# Patient Record
Sex: MALE | Race: WHITE | Employment: FULL TIME | URBAN - METROPOLITAN AREA
[De-identification: names, ages, dates, MRNs, and addresses within clinical notes are randomized per-mention and may not be internally consistent; named-entity substitution may affect disease eponyms.]

---

## 2019-09-12 ENCOUNTER — APPOINTMENT (OUTPATIENT)
Dept: GENERAL RADIOLOGY | Age: 59
End: 2019-09-12
Payer: COMMERCIAL

## 2019-09-12 ENCOUNTER — HOSPITAL ENCOUNTER (EMERGENCY)
Age: 59
Discharge: HOME | End: 2019-09-12
Attending: EMERGENCY MEDICINE
Payer: COMMERCIAL

## 2019-09-12 VITALS
OXYGEN SATURATION: 98 % | HEIGHT: 64 IN | SYSTOLIC BLOOD PRESSURE: 145 MMHG | HEART RATE: 98 BPM | DIASTOLIC BLOOD PRESSURE: 89 MMHG | RESPIRATION RATE: 18 BRPM | TEMPERATURE: 98.3 F | BODY MASS INDEX: 23.9 KG/M2 | WEIGHT: 140 LBS

## 2019-09-12 DIAGNOSIS — M79.641 RIGHT HAND PAIN: ICD-10-CM

## 2019-09-12 DIAGNOSIS — S60.221A CONTUSION OF RIGHT HAND, INITIAL ENCOUNTER: Primary | ICD-10-CM

## 2019-09-12 DIAGNOSIS — L53.9 ERYTHEMA OF HAND: ICD-10-CM

## 2019-09-12 PROCEDURE — 6370000000 HC RX 637 (ALT 250 FOR IP): Performed by: PHYSICIAN ASSISTANT

## 2019-09-12 PROCEDURE — 99283 EMERGENCY DEPT VISIT LOW MDM: CPT

## 2019-09-12 PROCEDURE — 90715 TDAP VACCINE 7 YRS/> IM: CPT | Performed by: PHYSICIAN ASSISTANT

## 2019-09-12 PROCEDURE — 73130 X-RAY EXAM OF HAND: CPT

## 2019-09-12 PROCEDURE — 90471 IMMUNIZATION ADMIN: CPT | Performed by: PHYSICIAN ASSISTANT

## 2019-09-12 PROCEDURE — 6360000002 HC RX W HCPCS: Performed by: PHYSICIAN ASSISTANT

## 2019-09-12 PROCEDURE — 73110 X-RAY EXAM OF WRIST: CPT

## 2019-09-12 RX ORDER — DOXYCYCLINE HYCLATE 100 MG
100 TABLET ORAL 2 TIMES DAILY
Qty: 20 TABLET | Refills: 0 | Status: SHIPPED | OUTPATIENT
Start: 2019-09-12 | End: 2019-09-22

## 2019-09-12 RX ORDER — IBUPROFEN 600 MG/1
600 TABLET ORAL ONCE
Status: COMPLETED | OUTPATIENT
Start: 2019-09-12 | End: 2019-09-12

## 2019-09-12 RX ORDER — IBUPROFEN 600 MG/1
600 TABLET ORAL EVERY 6 HOURS PRN
Qty: 30 TABLET | Refills: 0 | Status: SHIPPED | OUTPATIENT
Start: 2019-09-12

## 2019-09-12 RX ADMIN — TETANUS TOXOID, REDUCED DIPHTHERIA TOXOID AND ACELLULAR PERTUSSIS VACCINE, ADSORBED 0.5 ML: 5; 2.5; 8; 8; 2.5 SUSPENSION INTRAMUSCULAR at 09:51

## 2019-09-12 RX ADMIN — IBUPROFEN 600 MG: 600 TABLET, FILM COATED ORAL at 09:51

## 2019-09-12 ASSESSMENT — PAIN DESCRIPTION - LOCATION: LOCATION: HAND

## 2019-09-12 ASSESSMENT — PAIN DESCRIPTION - PAIN TYPE: TYPE: ACUTE PAIN

## 2019-09-12 ASSESSMENT — PAIN DESCRIPTION - ORIENTATION: ORIENTATION: RIGHT

## 2019-09-12 ASSESSMENT — PAIN SCALES - GENERAL
PAINLEVEL_OUTOF10: 9
PAINLEVEL_OUTOF10: 9

## 2019-09-26 ENCOUNTER — HOSPITAL ENCOUNTER (OUTPATIENT)
Dept: MRI IMAGING | Age: 59
Discharge: HOME | End: 2019-09-26
Payer: COMMERCIAL

## 2019-09-26 DIAGNOSIS — S67.21XA CRUSH INJURY OF HAND, RIGHT, INITIAL ENCOUNTER: ICD-10-CM

## 2019-09-26 PROCEDURE — 73218 MRI UPPER EXTREMITY W/O DYE: CPT

## 2020-04-10 ENCOUNTER — HOSPITAL ENCOUNTER (OUTPATIENT)
Age: 60
Setting detail: SPECIMEN
Discharge: HOME | End: 2020-04-10
Payer: COMMERCIAL

## 2020-04-10 ENCOUNTER — OFFICE VISIT (OUTPATIENT)
Dept: PRIMARY CARE CLINIC | Age: 60
End: 2020-04-10
Payer: COMMERCIAL

## 2020-04-10 VITALS — TEMPERATURE: 97.6 F | OXYGEN SATURATION: 99 % | HEART RATE: 107 BPM

## 2020-04-10 PROCEDURE — ~ 87635 HC SO COVID-19 ANY TECHNIQUE NON-CDC

## 2020-04-10 PROCEDURE — 99213 OFFICE O/P EST LOW 20 MIN: CPT | Performed by: FAMILY MEDICINE

## 2020-04-10 PROCEDURE — U0002 COVID-19 LAB TEST NON-CDC: HCPCS

## 2020-04-10 RX ORDER — ALBUTEROL SULFATE 90 UG/1
2 AEROSOL, METERED RESPIRATORY (INHALATION) 4 TIMES DAILY PRN
Qty: 3 INHALER | Refills: 1 | Status: SHIPPED | OUTPATIENT
Start: 2020-04-10

## 2020-04-10 RX ORDER — PREDNISONE 10 MG/1
10 TABLET ORAL DAILY
Qty: 10 TABLET | Refills: 0 | Status: SHIPPED | OUTPATIENT
Start: 2020-04-10 | End: 2020-04-20

## 2020-04-10 RX ORDER — AZITHROMYCIN 250 MG/1
250 TABLET, FILM COATED ORAL SEE ADMIN INSTRUCTIONS
Qty: 6 TABLET | Refills: 0 | Status: SHIPPED | OUTPATIENT
Start: 2020-04-10 | End: 2020-04-15

## 2020-04-10 NOTE — PROGRESS NOTES
4/10/20  Candi Mane  1960      [address] [facility] [place]   FLU/COVID-19 CLINIC EVALUATION    HPI SYMPTOMS: Presented with little over 1 w/h/o of sore throat and body aches and pain. For past 48 hours, he is feeling that his above symptoms are getting worst and also develop shortness or breath. He is feverish and low energy. His shortness of breath is also getting worse. He smokes 1/2 pack per day and currently not on any inhalers. He works at [employer]. [x] Fevers  [] Chills  [x] Cough  [] Coughing up blood  [x] Chest Congestion  [] Nasal Congestion  [x] Feeling short of breath  [x] Sometimes  [] Frequently  [] All the time  [] Chest pain  [] Headaches  []Tolerable  [] Severe  [] Sore throat  [x] Muscle aches  [] Nausea  [] Vomiting  []Unable to keep fluids down  [] Diarrhea  []Severe    [] OTHER SYMPTOMS:      Symptom Duration:   [] 1  [] 2   [] 3   [] 4    [] 5   [] 6   [x] 7   [] 8   [] 9   [] 10   [] 11   [] 12   [] 13   [] 14   [] Longer than 14 days    Symptom course:   [x] Worsening     [] Stable     [] Improving    RISK FACTORS:    [] Pregnant or possibly pregnant  [] Age over 61  [] Diabetes  [x] Heart disease  [] Asthma  [] COPD/Other chronic lung diseases  [] Active Cancer  [] On Chemotherapy  [] Taking oral steroids  [] History Lymphoma/Leukemia  [] Close contact with a lab confirmed COVID-19 patient within 14 days of symptom onset  [] History of travel from affected geographical areas within 14 days of symptom onset       VITALS:  Vitals:    04/10/20 1353   Pulse: 107   Temp: 97.6 °F (36.4 °C)   SpO2: 99%      Physical Exam   Constitutional: He is oriented to person, place, and time. He appears well-developed and well-nourished. HENT:   Head: Atraumatic. Mouth/Throat: Oropharynx is clear and moist. No oropharyngeal exudate. Pharyngeal erythema   Eyes: Conjunctivae are normal.   Neck: Normal range of motion. Cardiovascular: Regular rhythm. No murmur heard.   Tachycardia

## 2020-04-13 ENCOUNTER — CARE COORDINATION (OUTPATIENT)
Dept: FAMILY MEDICINE CLINIC | Age: 60
End: 2020-04-13

## 2020-04-13 LAB
SARS-COV-2: NORMAL
SOURCE: NORMAL

## 2020-04-13 NOTE — CARE COORDINATION
COVID-19 Screening Initial Follow-up Note    Patient contacted regarding Tony Solis. Care Transition Nurse/ Ambulatory Care Manager contacted the patient by telephone to perform post discharge assessment. Verified name and  with patient as identifiers. Provided introduction to self, and explanation of the CTN/ACM role, and reason for call due to risk factors for infection and/or exposure to COVID-19. Symptoms reviewed with patient who verbalized the following symptoms: fever, fatigue, pain or aching joints, cough, shortness of breath, sweating, dizziness/lightheadedness and no new/worsening symptoms       Due to no new or worsening symptoms encounter was not routed to provider for escalation. Patient has following risk factors of: CAD and no known risk factors. CTN/ACM reviewed discharge instructions, medical action plan and red flags such as increased shortness of breath, increasing fever and signs of decompensation with patient who verbalized understanding. Discussed exposure protocols and quarantine with CDC Guidelines What to do if you are sick with coronavirus disease  Patient was given an opportunity for questions and concerns. The patient agrees to contact the [facility] exposure line [phone], local [facility] department [place] [facility]: ([phone]) and PCP office for questions related to their healthcare. CTN/ACM provided contact information for future reference. Reviewed and educated patient on any new and changed medications related to discharge diagnosis     Patient/family/caregiver given information for GetWell Loop and agrees to enroll yes  Patient's preferred e-mail:    Patient's preferred phone number:   Based on Loop alert triggers, patient will be contacted by nurse care manager for worsening symptoms.     Plan for follow-up monitoring in GetWell Loop for next 14 days based on severity of symptoms and risk factors      Balwinder Land RN, MSN  Ambulatory Care Manager  [phone]

## 2020-04-19 ENCOUNTER — CARE COORDINATION (OUTPATIENT)
Dept: CARE COORDINATION | Age: 60
End: 2020-04-19

## 2020-04-19 NOTE — CARE COORDINATION
Date/Time:  2020 4:13 PM    Patient contacted regarding Loop Alert received. Verified patients name and  as identifiers. RN contacted the patient by telephone regarding yellow Loop alert. Patient reported the following symptoms: fatigue and shortness of breath, but reports these are \"about how they have been\" and are not worsening. Due to no new or worsing symptoms, patient was advised to self-monitor symptoms at home. Education provided regarding infection prevention, and signs and symptoms of COVID-19 and when to seek medical attention with patient who verbalized understanding. Discussed exposure protocols and quarantine from [address] you at higher risk for severe illness  and given an opportunity for questions and concerns. The patient agrees to contact the COVID-19 hotline [phone] or PCP office for questions related to their healthcare. CTN/ACM provided contact information for future needs. From CDC: Are you at higher risk for severe illness?  Wash your hands often.  Avoid close contact (6 feet, which is about two arm lengths) with people who are sick.  Put distance between yourself and other people if COVID-19 is spreading in your community.  Clean and disinfect frequently touched surfaces.  Avoid all cruise travel and non-essential air travel.  Call your healthcare professional if you have concerns about COVID-19 and your underlying condition or if you are sick. For more information on steps you can take to protect yourself, see CDC's How to Protect Yourself      Patient will continue to self report symptoms using Loop self monitoring. Patient has RN's contact information.

## 2020-04-22 ENCOUNTER — CARE COORDINATION (OUTPATIENT)
Dept: FAMILY MEDICINE CLINIC | Age: 60
End: 2020-04-22

## 2020-04-23 ENCOUNTER — CARE COORDINATION (OUTPATIENT)
Dept: CASE MANAGEMENT | Age: 60
End: 2020-04-23

## 2020-04-23 ENCOUNTER — CARE COORDINATION (OUTPATIENT)
Dept: CARE COORDINATION | Age: 60
End: 2020-04-23

## 2020-04-23 NOTE — CARE COORDINATION
RN/LPN placed call to patient to respond to Yellow alert due to Worsening of symptoms in GetWell Loop. Patient reports pt reports just spoke with another nurse. Able to speak without being sob.         Sarah Roger RN     RN/LPN

## 2020-04-23 NOTE — CARE COORDINATION
Patient did not return call, but responded to Loop message at 8:37 pm on 4/22. The breathing I guess is about the same sometimes I'll have sharp pains it feels like in my lungs every once in awhile but I'm more tired drained of energy little more other than that I guess I'm okay wish I would get better thanks for asking I'll let you know if it gets any worse thank you. RN responded to patient at 8:21 on 4/23 to continue to enter symptoms and report any worsening symptoms in Loop and to PCP.     Joanna Peña RN, MSN  Ambulatory Care Manager  [phone]

## 2020-04-25 ENCOUNTER — CARE COORDINATION (OUTPATIENT)
Dept: CARE COORDINATION | Age: 60
End: 2020-04-25

## 2020-04-27 ENCOUNTER — CARE COORDINATION (OUTPATIENT)
Dept: CASE MANAGEMENT | Age: 60
End: 2020-04-27

## 2020-04-27 NOTE — CARE COORDINATION
COVID LOOP YELLOW ALERT FOLLOW UP CALL:    Yellow Alert noted in remote [address] Monitoring Program.     Patient E-mailed Response 4/26/2020 Jeanie Cui, 11:42 PM     It is Sunday night still not feeling very well I have no more things to fill out I still feel something is wrong my breathing is still shallow and I'm very fatigued my muscles ache something's up I should be getting better by now I would think but I'm not could I have caught this after you took the test please get back with me thank you Iris Davila April 26th20/20. Attempted to call patient - left message with phone number to call back. Also to go to ER if symptoms and breathing worse. Also sent Loop E-mail to patient to call back. Waiting for Response. No further E-mail or Phone call Responses from patient.     Gulshan Gar, LILIANA     [address] [facility]

## 2020-04-28 ENCOUNTER — CARE COORDINATION (OUTPATIENT)
Dept: FAMILY MEDICINE CLINIC | Age: 60
End: 2020-04-28

## 2020-05-11 ENCOUNTER — TELEPHONE (OUTPATIENT)
Dept: PRIMARY CARE CLINIC | Age: 60
End: 2020-05-11

## 2023-02-09 ENCOUNTER — HOSPITAL ENCOUNTER (EMERGENCY)
Age: 63
Discharge: HOME | End: 2023-02-09
Attending: EMERGENCY MEDICINE
Payer: COMMERCIAL

## 2023-02-09 VITALS
RESPIRATION RATE: 18 BRPM | OXYGEN SATURATION: 99 % | BODY MASS INDEX: 20.14 KG/M2 | SYSTOLIC BLOOD PRESSURE: 163 MMHG | DIASTOLIC BLOOD PRESSURE: 92 MMHG | TEMPERATURE: 98.6 F | WEIGHT: 118 LBS | HEART RATE: 78 BPM | HEIGHT: 64 IN

## 2023-02-09 DIAGNOSIS — T30.0 BURN: Primary | ICD-10-CM

## 2023-02-09 PROCEDURE — 90715 TDAP VACCINE 7 YRS/> IM: CPT | Performed by: EMERGENCY MEDICINE

## 2023-02-09 PROCEDURE — 16020 DRESS/DEBRID P-THICK BURN S: CPT

## 2023-02-09 PROCEDURE — 99284 EMERGENCY DEPT VISIT MOD MDM: CPT

## 2023-02-09 PROCEDURE — 6370000000 HC RX 637 (ALT 250 FOR IP): Performed by: EMERGENCY MEDICINE

## 2023-02-09 PROCEDURE — 6360000002 HC RX W HCPCS: Performed by: EMERGENCY MEDICINE

## 2023-02-09 PROCEDURE — 90471 IMMUNIZATION ADMIN: CPT | Performed by: EMERGENCY MEDICINE

## 2023-02-09 RX ORDER — IBUPROFEN 800 MG/1
800 TABLET ORAL 2 TIMES DAILY PRN
Qty: 20 TABLET | Refills: 0 | Status: SHIPPED | OUTPATIENT
Start: 2023-02-09

## 2023-02-09 RX ORDER — GINSENG 100 MG
CAPSULE ORAL
Qty: 14 G | Refills: 0 | Status: SHIPPED | OUTPATIENT
Start: 2023-02-09 | End: 2023-02-19

## 2023-02-09 RX ORDER — GINSENG 100 MG
CAPSULE ORAL ONCE
Status: COMPLETED | OUTPATIENT
Start: 2023-02-09 | End: 2023-02-09

## 2023-02-09 RX ORDER — IBUPROFEN 400 MG/1
800 TABLET ORAL ONCE
Status: COMPLETED | OUTPATIENT
Start: 2023-02-09 | End: 2023-02-09

## 2023-02-09 RX ADMIN — TETANUS TOXOID, REDUCED DIPHTHERIA TOXOID AND ACELLULAR PERTUSSIS VACCINE, ADSORBED 0.5 ML: 5; 2.5; 8; 8; 2.5 SUSPENSION INTRAMUSCULAR at 22:57

## 2023-02-09 RX ADMIN — IBUPROFEN 800 MG: 400 TABLET, FILM COATED ORAL at 22:52

## 2023-02-09 RX ADMIN — BACITRACIN: 500 OINTMENT TOPICAL at 22:53
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
## 2023-11-09 ENCOUNTER — TELEPHONE (OUTPATIENT)
Dept: ONCOLOGY | Age: 63
End: 2023-11-09

## 2023-11-09 NOTE — TELEPHONE ENCOUNTER
LVM for return call for pt to schedule NP appt with Tiff Valverde for Hilar Lymphadenopathy. LVM with direct contact information.

## 2023-11-17 ENCOUNTER — HOSPITAL ENCOUNTER (OUTPATIENT)
Dept: INFUSION THERAPY | Age: 63
Discharge: HOME | End: 2023-11-17
Payer: COMMERCIAL

## 2023-11-17 ENCOUNTER — INITIAL CONSULT (OUTPATIENT)
Dept: ONCOLOGY | Age: 63
End: 2023-11-17
Payer: COMMERCIAL

## 2023-11-17 VITALS
HEART RATE: 85 BPM | OXYGEN SATURATION: 98 % | DIASTOLIC BLOOD PRESSURE: 80 MMHG | TEMPERATURE: 97.7 F | HEIGHT: 64 IN | BODY MASS INDEX: 20.59 KG/M2 | SYSTOLIC BLOOD PRESSURE: 145 MMHG | WEIGHT: 120.6 LBS

## 2023-11-17 DIAGNOSIS — Z12.11 SCREENING FOR MALIGNANT NEOPLASM OF COLON: ICD-10-CM

## 2023-11-17 DIAGNOSIS — R63.4 WEIGHT LOSS, UNINTENTIONAL: Primary | ICD-10-CM

## 2023-11-17 PROCEDURE — 99211 OFF/OP EST MAY X REQ PHY/QHP: CPT

## 2023-11-17 PROCEDURE — G8484 FLU IMMUNIZE NO ADMIN: HCPCS | Performed by: INTERNAL MEDICINE

## 2023-11-17 PROCEDURE — G8420 CALC BMI NORM PARAMETERS: HCPCS | Performed by: INTERNAL MEDICINE

## 2023-11-17 PROCEDURE — 3017F COLORECTAL CA SCREEN DOC REV: CPT | Performed by: INTERNAL MEDICINE

## 2023-11-17 PROCEDURE — 99204 OFFICE O/P NEW MOD 45 MIN: CPT | Performed by: INTERNAL MEDICINE

## 2023-11-17 PROCEDURE — 4004F PT TOBACCO SCREEN RCVD TLK: CPT | Performed by: INTERNAL MEDICINE

## 2023-11-17 PROCEDURE — G8427 DOCREV CUR MEDS BY ELIG CLIN: HCPCS | Performed by: INTERNAL MEDICINE

## 2023-11-17 RX ORDER — METHOCARBAMOL 500 MG/1
500 TABLET, FILM COATED ORAL NIGHTLY
COMMUNITY

## 2023-11-17 RX ORDER — FLUTICASONE PROPIONATE AND SALMETEROL 100; 50 UG/1; UG/1
1 POWDER RESPIRATORY (INHALATION) EVERY 12 HOURS
COMMUNITY

## 2023-11-17 RX ORDER — TAMSULOSIN HYDROCHLORIDE 0.4 MG/1
0.4 CAPSULE ORAL DAILY
COMMUNITY

## 2023-11-17 RX ORDER — DOCUSATE SODIUM 100 MG/1
100 CAPSULE, LIQUID FILLED ORAL 2 TIMES DAILY
COMMUNITY

## 2023-11-17 RX ORDER — GUAIFENESIN 600 MG/1
1200 TABLET, EXTENDED RELEASE ORAL 2 TIMES DAILY
COMMUNITY

## 2023-11-17 ASSESSMENT — PATIENT HEALTH QUESTIONNAIRE - PHQ9
SUM OF ALL RESPONSES TO PHQ9 QUESTIONS 1 & 2: 0
1. LITTLE INTEREST OR PLEASURE IN DOING THINGS: 0
SUM OF ALL RESPONSES TO PHQ QUESTIONS 1-9: 0
SUM OF ALL RESPONSES TO PHQ QUESTIONS 1-9: 0
2. FEELING DOWN, DEPRESSED OR HOPELESS: 0
SUM OF ALL RESPONSES TO PHQ QUESTIONS 1-9: 0
SUM OF ALL RESPONSES TO PHQ QUESTIONS 1-9: 0

## 2023-11-17 NOTE — PROGRESS NOTES
MA Rooming Questions  Patient: Dell Perera  MRN: [id number]    Date: 11/17/2023        New Patient     5. Did the patient have a depression screening completed today?  Yes    No data recorded     PHQ-9 Given to (if applicable):               PHQ-9 Score (if applicable):                     [] Positive     []  Negative              Does question #9 need addressed (if applicable)                     [] Yes    []  No               Eva Combs MA
1     Types: Cigarettes    Smokeless tobacco: Never   Substance Use Topics    Alcohol use: No    Drug use: No   He used to smoke 2 and half PPD. Family History:   No family history on file. Allergies   Allergen Reactions    Norco [Hydrocodone-Acetaminophen]      ITCHING. Pcn [Penicillins] Swelling     Current Outpatient Medications on File Prior to Visit   Medication Sig Dispense Refill    ibuprofen (ADVIL;MOTRIN) 800 MG tablet Take 1 tablet by mouth 2 times daily as needed for Pain 20 tablet 0    albuterol sulfate HFA (VENTOLIN HFA) 108 (90 Base) MCG/ACT inhaler Inhale 2 puffs into the lungs 4 times daily as needed for Wheezing 3 Inhaler 1    ibuprofen (ADVIL;MOTRIN) 600 MG tablet Take 1 tablet by mouth every 6 hours as needed for Pain 30 tablet 0    HYDROcodone-acetaminophen (NORCO) 5-325 MG per tablet Take 1-2 tablets by mouth every 4 hours as needed for Pain 15 tablet 0    nitroGLYCERIN (NITROSTAT) 0.4 MG SL tablet Place 0.4 mg under the tongue every 5 minutes as needed for Chest pain      atorvastatin (LIPITOR) 40 MG tablet Take 40 mg by mouth daily      aspirin 81 MG tablet Take 81 mg by mouth daily      lisinopril (PRINIVIL;ZESTRIL) 2.5 MG tablet Take 2.5 mg by mouth daily      metoprolol (LOPRESSOR) 25 MG tablet Take 25 mg by mouth 2 times daily       No current facility-administered medications on file prior to visit. Review of Systems:    Constitutional:  stable weight, No fever, No chills, No night sweats. Energy level good. Eyes:  No diplopia, No transient or permanent loss of vision, No scotomata. ENT / Mouth:  No epistaxis, No dysphagia, No hoarseness, No oral ulcers, No gingival bleeding. No sore throat, No postnasal drip, No nasal drip, No mouth pain, No sinus pain, No tinnitus, Normal hearing. Cardiovascular:  No chest pain, No palpitations, No syncope, No upper extremity edema, No lower extremity edema, No calf discomfort. Respiratory:  No cough.   No hemoptysis, No pleurisy, No

## 2023-11-19 NOTE — PROGRESS NOTES
Patient Name:  Kaity Richard  Patient :  1960  Patient MRN:  [id number]     Primary Oncologist: Twin Baugh MD  Referring Provider: No primary care provider on file. Date of Service: 2023       Chief Complaint:  No chief complaint on file. FU visit. There is no problem list on file for this patient. HPI:   Kaity Richard is a pleasant 65 yo male patient who was referred for evaluation of hilar LAD in 2023. He was seen in the ER for chronic neck and back pain on 23. CT of cervical spine showed degenerative changes and no acute injury. CT head was neg for acute intracranial pathology. CT lumbar spine showed degenerative changes most pronounced at L4-5. No evidence of an acute fracture or subluxation. He weighed 146 a year ago and weight went down to 113. Currently weight is 120 lbs. 10/24/2023 creat 0.75, alb 3.8. TSH wnl. CBC wnl. PSA <0.1.     2023 CT chest:  1. Moderate centrilobular pulmonary emphysema   2. No acute lung opacity   3. A subcarinal 8 mm lymph node. A 12 x 18 mm right hilar lymph node. A 10 mm left superhilar lymph node. This could be reactive or lymphoproliferative. Continued follow-up suggested     I ordered CT AP d/t unintentional weight loss. He is agreeable to have evaluation for possible EBUS. He has bilateral LE numbness and tingling. Reports he is referred to neurologist.  We discuss about smoking cessation. I referred to GI for screening colonoscopy. He has one child. No cancer in the family. 2023 follow up visit  2032 s/p EBUS. Final Cytologic Diagnosis:   A. Level 7 Lymph Node Fine Needle Aspirate cytology with cell block:   -  Negative for malignancy. -  Lymphocytes are present. B.  4L Lymph Node Fine Needle Aspirate cytology with cell block:   - Negative for malignancy. -  Hypocellular sample. -  Very rare benign bronchial cells and mixed inflammation.     C.  11R Lymph Node Fine Needle Aspirate

## 2023-11-20 ENCOUNTER — TELEPHONE (OUTPATIENT)
Dept: GASTROENTEROLOGY | Age: 63
End: 2023-11-20

## 2023-11-20 NOTE — TELEPHONE ENCOUNTER
Called pt. In regards to a referral for a colon screening and weight loss. Lm for pt to call back to make an appt.

## 2023-11-21 ENCOUNTER — TELEPHONE (OUTPATIENT)
Dept: ONCOLOGY | Age: 63
End: 2023-11-21

## 2023-11-21 NOTE — TELEPHONE ENCOUNTER
Left message with time and prep of CT scan to be done on 12/6/23 [facility] arrival at  9 AM. Informed to call with any questions.

## 2023-11-22 ENCOUNTER — HOSPITAL ENCOUNTER (OUTPATIENT)
Dept: CT IMAGING | Age: 63
Discharge: HOME | End: 2023-11-22
Attending: INTERNAL MEDICINE

## 2023-11-22 DIAGNOSIS — Z00.6 EXAMINATION FOR NORMAL COMPARISON FOR CLINICAL RESEARCH: ICD-10-CM

## 2023-11-27 ENCOUNTER — HOSPITAL ENCOUNTER (OUTPATIENT)
Age: 63
Setting detail: OUTPATIENT SURGERY
Discharge: HOME | End: 2023-11-27
Attending: INTERNAL MEDICINE | Admitting: INTERNAL MEDICINE
Payer: COMMERCIAL

## 2023-11-27 ENCOUNTER — ANESTHESIA EVENT (OUTPATIENT)
Dept: ENDOSCOPY | Age: 63
End: 2023-11-27
Payer: COMMERCIAL

## 2023-11-27 ENCOUNTER — ANESTHESIA (OUTPATIENT)
Dept: ENDOSCOPY | Age: 63
End: 2023-11-27
Payer: COMMERCIAL

## 2023-11-27 VITALS
DIASTOLIC BLOOD PRESSURE: 76 MMHG | TEMPERATURE: 98 F | WEIGHT: 120 LBS | HEART RATE: 68 BPM | RESPIRATION RATE: 16 BRPM | OXYGEN SATURATION: 96 % | SYSTOLIC BLOOD PRESSURE: 138 MMHG | BODY MASS INDEX: 20.49 KG/M2 | HEIGHT: 64 IN

## 2023-11-27 PROCEDURE — 88177 CYTP FNA EVAL EA ADDL: CPT

## 2023-11-27 PROCEDURE — 2500000003 HC RX 250 WO HCPCS: Performed by: NURSE ANESTHETIST, CERTIFIED REGISTERED

## 2023-11-27 PROCEDURE — 3700000000 HC ANESTHESIA ATTENDED CARE: Performed by: INTERNAL MEDICINE

## 2023-11-27 PROCEDURE — 7100000001 HC PACU RECOVERY - ADDTL 15 MIN: Performed by: INTERNAL MEDICINE

## 2023-11-27 PROCEDURE — 88305 TISSUE EXAM BY PATHOLOGIST: CPT

## 2023-11-27 PROCEDURE — 88172 CYTP DX EVAL FNA 1ST EA SITE: CPT

## 2023-11-27 PROCEDURE — C1725 CATH, TRANSLUMIN NON-LASER: HCPCS | Performed by: INTERNAL MEDICINE

## 2023-11-27 PROCEDURE — 6360000002 HC RX W HCPCS: Performed by: NURSE ANESTHETIST, CERTIFIED REGISTERED

## 2023-11-27 PROCEDURE — 3700000001 HC ADD 15 MINUTES (ANESTHESIA): Performed by: INTERNAL MEDICINE

## 2023-11-27 PROCEDURE — 2580000003 HC RX 258: Performed by: ANESTHESIOLOGY

## 2023-11-27 PROCEDURE — 7100000011 HC PHASE II RECOVERY - ADDTL 15 MIN: Performed by: INTERNAL MEDICINE

## 2023-11-27 PROCEDURE — 88173 CYTOPATH EVAL FNA REPORT: CPT

## 2023-11-27 PROCEDURE — 7100000000 HC PACU RECOVERY - FIRST 15 MIN: Performed by: INTERNAL MEDICINE

## 2023-11-27 PROCEDURE — 7100000010 HC PHASE II RECOVERY - FIRST 15 MIN: Performed by: INTERNAL MEDICINE

## 2023-11-27 PROCEDURE — 2720000010 HC SURG SUPPLY STERILE: Performed by: INTERNAL MEDICINE

## 2023-11-27 PROCEDURE — 2709999900 HC NON-CHARGEABLE SUPPLY: Performed by: INTERNAL MEDICINE

## 2023-11-27 PROCEDURE — 3603165200 HC BRNCHSC EBUS GUIDED SAMPL 1/2 NODE STATION/STRUX: Performed by: INTERNAL MEDICINE

## 2023-11-27 RX ORDER — SODIUM CHLORIDE, SODIUM LACTATE, POTASSIUM CHLORIDE, CALCIUM CHLORIDE 600; 310; 30; 20 MG/100ML; MG/100ML; MG/100ML; MG/100ML
INJECTION, SOLUTION INTRAVENOUS CONTINUOUS
Status: DISCONTINUED | OUTPATIENT
Start: 2023-11-27 | End: 2023-11-27 | Stop reason: HOSPADM

## 2023-11-27 RX ORDER — ONDANSETRON 2 MG/ML
4 INJECTION INTRAMUSCULAR; INTRAVENOUS
Status: DISCONTINUED | OUTPATIENT
Start: 2023-11-27 | End: 2023-11-27 | Stop reason: HOSPADM

## 2023-11-27 RX ORDER — SODIUM CHLORIDE 0.9 % (FLUSH) 0.9 %
5-40 SYRINGE (ML) INJECTION PRN
Status: DISCONTINUED | OUTPATIENT
Start: 2023-11-27 | End: 2023-11-27 | Stop reason: HOSPADM

## 2023-11-27 RX ORDER — LABETALOL HYDROCHLORIDE 5 MG/ML
10 INJECTION, SOLUTION INTRAVENOUS
Status: DISCONTINUED | OUTPATIENT
Start: 2023-11-27 | End: 2023-11-27 | Stop reason: HOSPADM

## 2023-11-27 RX ORDER — PROPOFOL 10 MG/ML
INJECTION, EMULSION INTRAVENOUS PRN
Status: DISCONTINUED | OUTPATIENT
Start: 2023-11-27 | End: 2023-11-27 | Stop reason: SDUPTHER

## 2023-11-27 RX ORDER — HYDRALAZINE HYDROCHLORIDE 20 MG/ML
10 INJECTION INTRAMUSCULAR; INTRAVENOUS
Status: DISCONTINUED | OUTPATIENT
Start: 2023-11-27 | End: 2023-11-27 | Stop reason: HOSPADM

## 2023-11-27 RX ORDER — FENTANYL CITRATE 50 UG/ML
25 INJECTION, SOLUTION INTRAMUSCULAR; INTRAVENOUS EVERY 5 MIN PRN
Status: DISCONTINUED | OUTPATIENT
Start: 2023-11-27 | End: 2023-11-27 | Stop reason: HOSPADM

## 2023-11-27 RX ORDER — SODIUM CHLORIDE 0.9 % (FLUSH) 0.9 %
5-40 SYRINGE (ML) INJECTION PRN
Status: CANCELLED | OUTPATIENT
Start: 2023-11-27

## 2023-11-27 RX ORDER — LIDOCAINE HYDROCHLORIDE 20 MG/ML
INJECTION, SOLUTION INTRAVENOUS PRN
Status: DISCONTINUED | OUTPATIENT
Start: 2023-11-27 | End: 2023-11-27 | Stop reason: SDUPTHER

## 2023-11-27 RX ORDER — SODIUM CHLORIDE 0.9 % (FLUSH) 0.9 %
5-40 SYRINGE (ML) INJECTION EVERY 12 HOURS SCHEDULED
Status: DISCONTINUED | OUTPATIENT
Start: 2023-11-27 | End: 2023-11-27 | Stop reason: HOSPADM

## 2023-11-27 RX ORDER — OXYCODONE HYDROCHLORIDE 5 MG/1
5 TABLET ORAL
Status: DISCONTINUED | OUTPATIENT
Start: 2023-11-27 | End: 2023-11-27 | Stop reason: HOSPADM

## 2023-11-27 RX ORDER — ROCURONIUM BROMIDE 10 MG/ML
INJECTION, SOLUTION INTRAVENOUS PRN
Status: DISCONTINUED | OUTPATIENT
Start: 2023-11-27 | End: 2023-11-27 | Stop reason: SDUPTHER

## 2023-11-27 RX ORDER — DROPERIDOL 2.5 MG/ML
0.62 INJECTION, SOLUTION INTRAMUSCULAR; INTRAVENOUS EVERY 10 MIN PRN
Status: DISCONTINUED | OUTPATIENT
Start: 2023-11-27 | End: 2023-11-27 | Stop reason: HOSPADM

## 2023-11-27 RX ORDER — SODIUM CHLORIDE 9 MG/ML
INJECTION, SOLUTION INTRAVENOUS PRN
Status: CANCELLED | OUTPATIENT
Start: 2023-11-27

## 2023-11-27 RX ORDER — SODIUM CHLORIDE 0.9 % (FLUSH) 0.9 %
5-40 SYRINGE (ML) INJECTION EVERY 12 HOURS SCHEDULED
Status: CANCELLED | OUTPATIENT
Start: 2023-11-27

## 2023-11-27 RX ORDER — PHENYLEPHRINE HCL IN 0.9% NACL 1 MG/10 ML
SYRINGE (ML) INTRAVENOUS PRN
Status: DISCONTINUED | OUTPATIENT
Start: 2023-11-27 | End: 2023-11-27 | Stop reason: SDUPTHER

## 2023-11-27 RX ORDER — DIPHENHYDRAMINE HYDROCHLORIDE 50 MG/ML
12.5 INJECTION INTRAMUSCULAR; INTRAVENOUS
Status: DISCONTINUED | OUTPATIENT
Start: 2023-11-27 | End: 2023-11-27 | Stop reason: HOSPADM

## 2023-11-27 RX ADMIN — SUGAMMADEX 200 MG: 100 INJECTION, SOLUTION INTRAVENOUS at 11:47

## 2023-11-27 RX ADMIN — ROCURONIUM BROMIDE 40 MG: 10 INJECTION INTRAVENOUS at 10:28

## 2023-11-27 RX ADMIN — Medication 100 MCG: at 10:38

## 2023-11-27 RX ADMIN — LIDOCAINE HYDROCHLORIDE 100 MG: 20 INJECTION, SOLUTION INTRAVENOUS at 10:28

## 2023-11-27 RX ADMIN — PROPOFOL 150 MG: 10 INJECTION, EMULSION INTRAVENOUS at 10:28

## 2023-11-27 RX ADMIN — SODIUM CHLORIDE, POTASSIUM CHLORIDE, SODIUM LACTATE AND CALCIUM CHLORIDE: 600; 310; 30; 20 INJECTION, SOLUTION INTRAVENOUS at 09:05

## 2023-11-27 ASSESSMENT — LIFESTYLE VARIABLES: SMOKING_STATUS: 1

## 2023-11-27 ASSESSMENT — ENCOUNTER SYMPTOMS: SHORTNESS OF BREATH: 1

## 2023-11-27 ASSESSMENT — PAIN SCALES - GENERAL
PAINLEVEL_OUTOF10: 0

## 2023-11-27 NOTE — PROGRESS NOTES
Received report from Niecy Barajas. Patient alert and oriented. Verified patient's name, , allergies and procedure. No beta blockers, no blood thinners, no implants. H&P on chart. No family/friend at bedside.

## 2023-11-27 NOTE — PROGRESS NOTES
1320 DC instructions reviewed with pt and child (via phone) all parities express understanding. 1345 Pt. To DC home in private vehicle.

## 2023-11-27 NOTE — DISCHARGE INSTRUCTIONS
[facility]  [phone]    Do not drive, work around [address] or use equipment. Do not drink any alcoholic beverages. Do not smoke while alone. Avoid making important decisions. Plan to spend a quiet, relaxed evening @ home. Resume normal activities as you begin to feel better. Eat lightly for your first meal, then gradually increase your diet to what is normal for you. In case of nausea, avoid food and drink only clear liquids. Resume food as nausea ceases. Notify your surgeon if you experience fever, chills, large amount of bleeding, difficulty breathing, persistent nausea and vomiting or any other disturbing problem. Call for a follow-up appointment with your surgeon.

## 2023-11-27 NOTE — ANESTHESIA POSTPROCEDURE EVALUATION
Department of Anesthesiology  Postprocedure Note    Patient: Kyle Heredia  MRN: [id number]  [address] [facility] 1960  Date of evaluation: 11/27/2023      Procedure Summary     Date: 11/27/23 Room / Location: [address] [place] [address]    Anesthesia Start: 1016 Anesthesia Stop:     Procedure: BRONCHOSCOPY ENDOBRONCHIAL ULTRASOUND with biopsies at station 7, 4L, 11R, 10R Diagnosis:       Hilar lymphadenopathy      (Hilar lymphadenopathy [R59.0])    Surgeons: Tresa Holland MD Responsible Provider:     Anesthesia Type: General ASA Status: 4          Anesthesia Type: General    Alona Phase I: Alona Score: 10    Alona Phase II:        Anesthesia Post Evaluation    Patient location during evaluation: PACU  Patient participation: complete - patient participated  Level of consciousness: awake  Pain score: 0  Nausea & Vomiting: no nausea and no vomiting  Complications: no  Cardiovascular status: blood pressure returned to baseline  Respiratory status: acceptable  Hydration status: euvolemic  Pain management: adequate

## 2023-11-27 NOTE — PROGRESS NOTES
First [address] balloon had a hole in it, removed intact. Second [address] balloon applied.

## 2023-11-27 NOTE — PROGRESS NOTES
1320 DC instructions reviewed with pt and daughter, all parties express understanding. 1342 Pt. To DC home in private vehicle.

## 2023-11-27 NOTE — PROGRESS NOTES
Patient transferred to PACU, bay 11 by cart with oxygen at 6 lpm by simple face mask and paperwork accompanied by Yaneli Bundy CRNA and this nurse.   Bedside report given to Parvez Bryan RN at 2334

## 2023-11-27 NOTE — PROCEDURES
[facility]  [id number]  1960 11/27/2023  11:47 AM    Preprocedure Diagnosis:  mediastinal adenopathy  Indication: biopsy  Postprocedure Diagnosis:  same    Sedation/Anesthesia Type:  GETA     Consent: Informed written consent was obtained prior to the procedure signed by patient      Time Out Verification:  Immediately prior to the start of the operation/procedure an active 'time out' was performed by all members of the team. The patient was identified using two patient identifiers, the correct side and site were identified (if applicable), the operation/procedure was agreed upon, the correct patient position was identified (if applicable) and the availability of correct implants and any special equipment or special requirements was confirmed (if applicable). Procedure Details:    After GETA was provided by anesthesia, the fiberoptic bronchoscope was advanced through the ETT to the main keena. A complete airway survey was performed with findings listed below. The fiberoptic scope was removed and EBUS scope introduced. A complete mediastinal survey was performed using the EBUS scope with the findings listed below. Biopsies were obtained from stations 7, 4L, 11R superior, 10R. After adequate hemostasis was assured, the EBUS scope was removed and the patient was recovered by anesthesia.      Findings:   Airways: normal, no endobronchial lesions seen  Secretions: moderate thick    Lymph Node Evaluation:  Station: Size: Number of Biopsies: Rapid On-site Eval:   2R Not evaluated None N/A   4R No LN seen None N/A   2L Not evaluated None N/A   4L LN seen, saved images for size/biopsy TBNAx 4 attempts Non-diagnostic   7 17 mm, more than one LN, images saved TBNAx 5 attempts Negative, lymph tissue seen   10R LN seen, saved images for size/biopsy TBNAx 3 attempts N/A and samples placed entirely in cell block   11R LN seen, saved images for size/biopsy TBNAx 5 attempts Negative,

## 2023-11-27 NOTE — PROGRESS NOTES
1158: Arrived to PACU from Endo. Monitors applied, alarms on. Report obtained from [facility] and [facility]. Non productive coughing. 1215: Productive cough of small amount red sputum. 1225: Productive cough of small amount light red sputum.

## 2023-11-29 ENCOUNTER — TELEPHONE (OUTPATIENT)
Dept: GASTROENTEROLOGY | Age: 63
End: 2023-11-29

## 2023-11-29 NOTE — TELEPHONE ENCOUNTER
Called pt. In regards to a referral for a colon screening and weight loss. Pt. Stated he was already scheduled with dr. Joey Hennessy for gastro.

## 2023-11-30 LAB
HBA1C MFR BLD: 5.7 % (ref 4.8–5.6)
RPR: NON REACTIVE
VITAMIN B-12: 883 PG/ML (ref 232–1245)

## 2023-12-11 ENCOUNTER — OFFICE VISIT (OUTPATIENT)
Dept: ONCOLOGY | Age: 63
End: 2023-12-11
Payer: COMMERCIAL

## 2023-12-11 ENCOUNTER — HOSPITAL ENCOUNTER (OUTPATIENT)
Dept: INFUSION THERAPY | Age: 63
Discharge: HOME | End: 2023-12-11
Payer: COMMERCIAL

## 2023-12-11 VITALS
RESPIRATION RATE: 20 BRPM | OXYGEN SATURATION: 98 % | DIASTOLIC BLOOD PRESSURE: 66 MMHG | HEIGHT: 64 IN | HEART RATE: 83 BPM | WEIGHT: 118.4 LBS | SYSTOLIC BLOOD PRESSURE: 134 MMHG | TEMPERATURE: 98 F | BODY MASS INDEX: 20.22 KG/M2

## 2023-12-11 DIAGNOSIS — R63.4 WEIGHT LOSS, UNINTENTIONAL: Primary | ICD-10-CM

## 2023-12-11 PROCEDURE — 99211 OFF/OP EST MAY X REQ PHY/QHP: CPT

## 2023-12-11 PROCEDURE — G8484 FLU IMMUNIZE NO ADMIN: HCPCS | Performed by: INTERNAL MEDICINE

## 2023-12-11 PROCEDURE — 4004F PT TOBACCO SCREEN RCVD TLK: CPT | Performed by: INTERNAL MEDICINE

## 2023-12-11 PROCEDURE — 99213 OFFICE O/P EST LOW 20 MIN: CPT | Performed by: INTERNAL MEDICINE

## 2023-12-11 PROCEDURE — G8420 CALC BMI NORM PARAMETERS: HCPCS | Performed by: INTERNAL MEDICINE

## 2023-12-11 PROCEDURE — G8427 DOCREV CUR MEDS BY ELIG CLIN: HCPCS | Performed by: INTERNAL MEDICINE

## 2023-12-11 PROCEDURE — 3017F COLORECTAL CA SCREEN DOC REV: CPT | Performed by: INTERNAL MEDICINE

## 2023-12-11 NOTE — PROGRESS NOTES
MA Rooming Questions  Patient: Williams Yeh  MRN: [id number]    Date: 12/11/2023        1. Do you have any new issues?   no         2. Do you need any refills on medications?    no    3. Have you had any imaging done since your last visit? No; pt did not have his CT done on 66/1 due to a conflict with another appt. Pt states he has not rescheduled this yet    4. Have you been hospitalized or seen in the emergency room since your last visit here?   no    5. Did the patient have a depression screening completed today?  No    No data recorded     PHQ-9 Given to (if applicable):               PHQ-9 Score (if applicable):                     [] Positive     []  Negative              Does question #9 need addressed (if applicable)                     [] Yes    []  No               Jeni Zavala MA

## 2024-02-09 ENCOUNTER — PROCEDURE VISIT (OUTPATIENT)
Dept: PHYSICAL MEDICINE AND REHAB | Age: 64
End: 2024-02-09

## 2024-02-09 DIAGNOSIS — G56.22 ULNAR NEUROPATHY AT ELBOW, LEFT: ICD-10-CM

## 2024-02-09 DIAGNOSIS — G56.03 BILATERAL CARPAL TUNNEL SYNDROME: ICD-10-CM

## 2024-02-09 DIAGNOSIS — M54.12 CERVICAL RADICULOPATHY AT C8: Primary | ICD-10-CM

## 2024-02-09 DIAGNOSIS — M79.601 PARESTHESIA AND PAIN OF BOTH UPPER EXTREMITIES: ICD-10-CM

## 2024-02-09 DIAGNOSIS — R20.2 PARESTHESIA AND PAIN OF BOTH UPPER EXTREMITIES: ICD-10-CM

## 2024-02-09 DIAGNOSIS — M79.602 PARESTHESIA AND PAIN OF BOTH UPPER EXTREMITIES: ICD-10-CM

## 2024-02-09 NOTE — PROGRESS NOTES
msec 22 10 cm   RIGHT  ULNAR 2.4 < 2.3 msec 5 10 cm   LEFT  ULNAR 2.1 < 2.3 msec 9 10 cm       Left dorsal ulnar sensory: Absent.        NEEDLE EMG:      RIGHT   LEFT     Insertional Activity Spontaneous  Activity Volutional  MUAP's Insertional Activity Spontaneous  Activity Volutional  MUAP's   Cerv Parasp Guarding None Normal Guarding None Normal   Infraspinatus Normal None Normal Normal None Normal   Deltoid   Normal None Normal Normal None Normal   Biceps   Normal None Normal Normal None Normal   Triceps   Normal None Dec # Normal None Normal   Pronator Teres Normal None Normal Normal None Normal   Extensor Indicis Normal None Normal Normal None Normal   1st Dorsal Interosseus Increased ++ Dec #, Polys Normal None Normal   ADM Increased ++ Dec #, Polys Normal None Normal   Abductor Pollicis Br. Increased + Dec #, Polys Normal None Normal       FINDINGS:   EMG of the cervical paraspinals and both upper limbs demonstrated significant paraspinal muscle guarding with needle exam of that region.  Positive waves, fibrillations and polyphasic motor units of reduced number were seen throughout the right hand intrinsic musculature.  Motor unit recruitment reduction was seen in the right triceps muscle as well.  All other motor units were of normal configuration and recruitment.  Median sensory latencies were mildly delayed at each wrist with preserved SNAP amplitudes.  The right median motor distal latency was delayed and the evoked amplitude was diminished.  Ulnar motor conductions revealed subtle slowing about the left elbow.  Ulnar nerve conduction through the right forearm was normal.      IMPRESSION:      1.  Abnormal EMG.  There is convincing evidence of a right C8 spinal nerve root injury (right C8 radiculopathy).  Together with his history of prior cervical spine surgery and the Bonnie's reflexes, and relatively acute cervical myelopathy cannot be excluded.  Correlation with cervical spine imaging is

## 2024-04-23 DIAGNOSIS — R63.4 WEIGHT LOSS, UNINTENTIONAL: Primary | ICD-10-CM

## 2024-04-29 ENCOUNTER — TELEPHONE (OUTPATIENT)
Dept: ONCOLOGY | Age: 64
End: 2024-04-29

## 2024-04-29 NOTE — TELEPHONE ENCOUNTER
Patient called given time and prep for CT scan to be done on 5/7/24 [facility] arrival at 10 AM.

## 2024-05-06 NOTE — PROGRESS NOTES
Patient Name:  Prasanna Starks  Patient :  1960  Patient MRN:  [id number]     Primary Oncologist: Parisa Theodore MD  Referring Provider: Frank Brown MD     Date of Service: 2024       Chief Complaint:    Chief Complaint   Patient presents with    Follow-up    Results     FU visit.     There is no problem list on file for this patient.     HPI:   Prasanna Starks is a pleasant 64 yo male patient who was referred for evaluation of hilar LAD in 2023.  He was seen in the ER for chronic neck and back pain on 23.   CT of cervical spine showed degenerative changes and no acute injury.  CT head was neg for acute intracranial pathology.  CT lumbar spine showed degenerative changes most pronounced at L4-5. No evidence of an acute fracture or subluxation.    He weighed 146 a year ago and weight went down to 113.   Currently weight is 120 lbs.    10/24/2023 creat 0.75, alb 3.8. TSH wnl. CBC wnl. PSA <0.1.     2023 CT chest:  1.  Moderate centrilobular pulmonary emphysema   2.  No acute lung opacity   3.  A subcarinal 8 mm lymph node. A 12 x 18 mm right hilar lymph node. A 10 mm left superhilar lymph node. This could be reactive or lymphoproliferative. Continued follow-up suggested     I ordered CT AP d/t unintentional weight loss.  He is agreeable to have evaluation for possible EBUS.    He has bilateral LE numbness and tingling. Reports he is referred to neurologist.  We discuss about smoking cessation.  I referred to GI for screening colonoscopy.    He has one child. No cancer in the family.    2023 s/p EBUS.   Final Cytologic Diagnosis:   A.  Level 7 Lymph Node Fine Needle Aspirate cytology with cell block:   -  Negative for malignancy.   -  Lymphocytes are present.    B.  4L Lymph Node Fine Needle Aspirate cytology with cell block:   - Negative for malignancy.   -  Hypocellular sample.   -  Very rare benign bronchial cells and mixed inflammation.    C.  11R Lymph Node Fine Needle Aspirate cytology

## 2024-05-07 ENCOUNTER — HOSPITAL ENCOUNTER (OUTPATIENT)
Dept: CT IMAGING | Age: 64
Discharge: HOME | End: 2024-05-07
Attending: INTERNAL MEDICINE
Payer: COMMERCIAL

## 2024-05-07 DIAGNOSIS — R63.4 WEIGHT LOSS, UNINTENTIONAL: ICD-10-CM

## 2024-05-07 LAB
EGFR, POC: >90 ML/MIN/1.73M2
POC CREATININE: 0.4 MG/DL (ref 0.5–1.2)

## 2024-05-07 PROCEDURE — 74177 CT ABD & PELVIS W/CONTRAST: CPT

## 2024-05-07 PROCEDURE — 82565 ASSAY OF CREATININE: CPT

## 2024-05-07 PROCEDURE — 6360000004 HC RX CONTRAST MEDICATION: Performed by: INTERNAL MEDICINE

## 2024-05-07 PROCEDURE — 2580000003 HC RX 258: Performed by: INTERNAL MEDICINE

## 2024-05-07 RX ORDER — SODIUM CHLORIDE 9 MG/ML
10 INJECTION, SOLUTION INTRAMUSCULAR; INTRAVENOUS; SUBCUTANEOUS PRN
Status: DISCONTINUED | OUTPATIENT
Start: 2024-05-07 | End: 2024-05-08 | Stop reason: HOSPADM

## 2024-05-07 RX ADMIN — IOPAMIDOL 18 ML: 755 INJECTION, SOLUTION INTRAVENOUS at 10:30

## 2024-05-07 RX ADMIN — SODIUM CHLORIDE, PRESERVATIVE FREE 10 ML: 5 INJECTION INTRAVENOUS at 11:51

## 2024-05-07 RX ADMIN — IOPAMIDOL 75 ML: 755 INJECTION, SOLUTION INTRAVENOUS at 11:54

## 2024-05-14 ENCOUNTER — OFFICE VISIT (OUTPATIENT)
Dept: ONCOLOGY | Age: 64
End: 2024-05-14
Payer: COMMERCIAL

## 2024-05-14 ENCOUNTER — HOSPITAL ENCOUNTER (OUTPATIENT)
Dept: INFUSION THERAPY | Age: 64
Discharge: HOME | End: 2024-05-14
Payer: COMMERCIAL

## 2024-05-14 VITALS
SYSTOLIC BLOOD PRESSURE: 138 MMHG | BODY MASS INDEX: 20.9 KG/M2 | HEART RATE: 90 BPM | TEMPERATURE: 97.7 F | HEIGHT: 64 IN | WEIGHT: 122.4 LBS | DIASTOLIC BLOOD PRESSURE: 70 MMHG | OXYGEN SATURATION: 97 %

## 2024-05-14 DIAGNOSIS — R59.0 LAD (LYMPHADENOPATHY), MEDIASTINAL: Primary | ICD-10-CM

## 2024-05-14 DIAGNOSIS — R59.0 LAD (LYMPHADENOPATHY), MEDIASTINAL: ICD-10-CM

## 2024-05-14 LAB
ALBUMIN SERPL-MCNC: 4.1 GM/DL (ref 3.4–5)
ALP BLD-CCNC: 98 IU/L (ref 40–129)
ALT SERPL-CCNC: 44 U/L (ref 10–40)
ANION GAP SERPL CALCULATED.3IONS-SCNC: 8 MMOL/L (ref 7–16)
AST SERPL-CCNC: 44 IU/L (ref 15–37)
BASOPHILS ABSOLUTE: 0.1 K/CU MM
BASOPHILS RELATIVE PERCENT: 0.5 % (ref 0–1)
BILIRUB SERPL-MCNC: 0.1 MG/DL (ref 0–1)
BUN SERPL-MCNC: 18 MG/DL (ref 6–23)
CALCIUM SERPL-MCNC: 8.9 MG/DL (ref 8.3–10.6)
CHLORIDE BLD-SCNC: 105 MMOL/L (ref 99–110)
CO2: 28 MMOL/L (ref 21–32)
CREAT SERPL-MCNC: 0.8 MG/DL (ref 0.9–1.3)
DIFFERENTIAL TYPE: ABNORMAL
EOSINOPHILS ABSOLUTE: 0.8 K/CU MM
EOSINOPHILS RELATIVE PERCENT: 8.1 % (ref 0–3)
GFR, ESTIMATED: >90 ML/MIN/1.73M2
GLUCOSE SERPL-MCNC: 119 MG/DL (ref 70–99)
HCT VFR BLD CALC: 43.7 % (ref 42–52)
HEMOGLOBIN: 14.1 GM/DL (ref 13.5–18)
LACTATE DEHYDROGENASE: 97 IU/L (ref 120–246)
LYMPHOCYTES ABSOLUTE: 2.7 K/CU MM
LYMPHOCYTES RELATIVE PERCENT: 26.7 % (ref 24–44)
MCH RBC QN AUTO: 30.7 PG (ref 27–31)
MCHC RBC AUTO-ENTMCNC: 32.3 % (ref 32–36)
MCV RBC AUTO: 95 FL (ref 78–100)
MONOCYTES ABSOLUTE: 1 K/CU MM
MONOCYTES RELATIVE PERCENT: 9.6 % (ref 0–4)
NEUTROPHILS ABSOLUTE: 5.5 K/CU MM
NEUTROPHILS RELATIVE PERCENT: 55.1 % (ref 36–66)
PDW BLD-RTO: 13.3 % (ref 11.7–14.9)
PLATELET # BLD: 221 K/CU MM (ref 140–440)
PMV BLD AUTO: 9.2 FL (ref 7.5–11.1)
POTASSIUM SERPL-SCNC: 4.3 MMOL/L (ref 3.5–5.1)
RBC # BLD: 4.6 M/CU MM (ref 4.6–6.2)
SODIUM BLD-SCNC: 141 MMOL/L (ref 135–145)
TOTAL PROTEIN: 6.7 GM/DL (ref 6.4–8.2)
WBC # BLD: 10 K/CU MM (ref 4–10.5)

## 2024-05-14 PROCEDURE — G8427 DOCREV CUR MEDS BY ELIG CLIN: HCPCS | Performed by: INTERNAL MEDICINE

## 2024-05-14 PROCEDURE — 85025 COMPLETE CBC W/AUTO DIFF WBC: CPT

## 2024-05-14 PROCEDURE — 3017F COLORECTAL CA SCREEN DOC REV: CPT | Performed by: INTERNAL MEDICINE

## 2024-05-14 PROCEDURE — 4004F PT TOBACCO SCREEN RCVD TLK: CPT | Performed by: INTERNAL MEDICINE

## 2024-05-14 PROCEDURE — 80053 COMPREHEN METABOLIC PANEL: CPT

## 2024-05-14 PROCEDURE — 36415 COLL VENOUS BLD VENIPUNCTURE: CPT

## 2024-05-14 PROCEDURE — 83615 LACTATE (LD) (LDH) ENZYME: CPT

## 2024-05-14 PROCEDURE — G8420 CALC BMI NORM PARAMETERS: HCPCS | Performed by: INTERNAL MEDICINE

## 2024-05-14 PROCEDURE — 99213 OFFICE O/P EST LOW 20 MIN: CPT | Performed by: INTERNAL MEDICINE

## 2024-05-14 PROCEDURE — 99211 OFF/OP EST MAY X REQ PHY/QHP: CPT

## 2024-05-14 RX ORDER — GABAPENTIN 100 MG/1
CAPSULE ORAL
COMMUNITY
Start: 2024-03-07

## 2024-05-14 RX ORDER — BUPRENORPHINE HYDROCHLORIDE AND NALOXONE HYDROCHLORIDE DIHYDRATE 8; 2 MG/1; MG/1
TABLET SUBLINGUAL
COMMUNITY
Start: 2024-03-02

## 2024-05-14 NOTE — PROGRESS NOTES
MA Rooming Questions  Patient: Prasanna Starks  MRN: [id number]    Date: 5/14/2024        1. Do you have any new issues?   yes - patient states he is scheduled for surgery 6/5 for his neck          2. Do you need any refills on medications?    no    3. Have you had any imaging done since your last visit?   yes - ct 5/7    4. Have you been hospitalized or seen in the emergency room since your last visit here?   no    5. Did the patient have a depression screening completed today? No    No data recorded     PHQ-9 Given to (if applicable):               PHQ-9 Score (if applicable):                     [] Positive     []  Negative              Does question #9 need addressed (if applicable)                     [] Yes    []  No               Mary Peck, CMA

## 2024-05-15 ENCOUNTER — CLINICAL DOCUMENTATION (OUTPATIENT)
Dept: ONCOLOGY | Age: 64
End: 2024-05-15

## 2024-05-15 NOTE — PROGRESS NOTES
5/15/24 - spoke w/ Makenna FAY at [facility] ([facility]) [phone] (option 4) . The CT abd pelvis was denied and a peer to peer was set up with Dr Mccauley on 5/15/24 at 4:15. I was able to give her the weight loss of the pt from the charts as noted on 5/14/24 when Parisa saw the pt again. It was above the 5% weight loss and she cancelled the peer to peer and approved the CT scan. The authorization is [id number] good from 5/7/24 - 11/3/24. I called central scheduling authorization dept and updated them with the authorization #.

## 2024-05-16 ENCOUNTER — TELEPHONE (OUTPATIENT)
Dept: ONCOLOGY | Age: 64
End: 2024-05-16

## 2024-05-16 NOTE — TELEPHONE ENCOUNTER
5/16/24 - left pt vm for the 5/28/24 CT chest at [facility] arrival time of 8:30 am and NPO 4 hours prior. I also mailed the appt info.

## 2024-05-17 ENCOUNTER — HOSPITAL ENCOUNTER (OUTPATIENT)
Dept: NON INVASIVE DIAGNOSTICS | Age: 64
Discharge: HOME | End: 2024-05-17
Payer: COMMERCIAL

## 2024-05-17 VITALS
SYSTOLIC BLOOD PRESSURE: 138 MMHG | DIASTOLIC BLOOD PRESSURE: 70 MMHG | HEART RATE: 90 BPM | BODY MASS INDEX: 20.83 KG/M2 | WEIGHT: 122 LBS | HEIGHT: 64 IN

## 2024-05-17 DIAGNOSIS — Z01.818 PREOP EXAMINATION: ICD-10-CM

## 2024-05-17 DIAGNOSIS — Z72.0 TOBACCO ABUSE: ICD-10-CM

## 2024-05-17 DIAGNOSIS — I25.10 ATHEROSCLEROSIS OF NATIVE CORONARY ARTERY OF NATIVE HEART WITHOUT ANGINA PECTORIS: ICD-10-CM

## 2024-05-17 DIAGNOSIS — G95.9 CERVICAL MYELOPATHY (HCC): ICD-10-CM

## 2024-05-17 DIAGNOSIS — I50.22 CHRONIC SYSTOLIC HEART FAILURE (HCC): ICD-10-CM

## 2024-05-17 LAB
ECHO AO ROOT DIAM: 3.1 CM
ECHO AO ROOT INDEX: 1.95 CM/M2
ECHO AV AREA PEAK VELOCITY: 2.1 CM2
ECHO AV AREA VTI: 2.5 CM2
ECHO AV AREA/BSA PEAK VELOCITY: 1.3 CM2/M2
ECHO AV AREA/BSA VTI: 1.6 CM2/M2
ECHO AV MEAN GRADIENT: 5 MMHG
ECHO AV MEAN VELOCITY: 1.1 M/S
ECHO AV PEAK GRADIENT: 9 MMHG
ECHO AV PEAK VELOCITY: 1.5 M/S
ECHO AV VELOCITY RATIO: 0.73
ECHO AV VTI: 24.3 CM
ECHO BSA: 1.58 M2
ECHO IVC PROX: 2.2 CM
ECHO LA DIAMETER INDEX: 1.51 CM/M2
ECHO LA DIAMETER: 2.4 CM
ECHO LA TO AORTIC ROOT RATIO: 0.77
ECHO LV E' LATERAL VELOCITY: 13 CM/S
ECHO LV E' SEPTAL VELOCITY: 11 CM/S
ECHO LV EDV A4C: 86 ML
ECHO LV EDV INDEX A4C: 54 ML/M2
ECHO LV EJECTION FRACTION A4C: 67 %
ECHO LV ESV A4C: 28 ML
ECHO LV ESV INDEX A4C: 18 ML/M2
ECHO LV FRACTIONAL SHORTENING: 41 % (ref 28–44)
ECHO LV INTERNAL DIMENSION DIASTOLE INDEX: 2.45 CM/M2
ECHO LV INTERNAL DIMENSION DIASTOLIC: 3.9 CM (ref 4.2–5.9)
ECHO LV INTERNAL DIMENSION SYSTOLIC INDEX: 1.45 CM/M2
ECHO LV INTERNAL DIMENSION SYSTOLIC: 2.3 CM
ECHO LV IVSD: 0.6 CM (ref 0.6–1)
ECHO LV MASS 2D: 68.2 G (ref 88–224)
ECHO LV MASS INDEX 2D: 42.9 G/M2 (ref 49–115)
ECHO LV POSTERIOR WALL DIASTOLIC: 0.7 CM (ref 0.6–1)
ECHO LV RELATIVE WALL THICKNESS RATIO: 0.36
ECHO LVOT AREA: 2.8 CM2
ECHO LVOT AV VTI INDEX: 0.88
ECHO LVOT DIAM: 1.9 CM
ECHO LVOT MEAN GRADIENT: 2 MMHG
ECHO LVOT PEAK GRADIENT: 5 MMHG
ECHO LVOT PEAK VELOCITY: 1.1 M/S
ECHO LVOT STROKE VOLUME INDEX: 38.1 ML/M2
ECHO LVOT SV: 60.6 ML
ECHO LVOT VTI: 21.4 CM
ECHO MV A VELOCITY: 0.7 M/S
ECHO MV E VELOCITY: 1.17 M/S
ECHO MV E/A RATIO: 1.67
ECHO MV E/E' LATERAL: 9
ECHO MV E/E' RATIO (AVERAGED): 9.82
ECHO MV E/E' SEPTAL: 10.64
ECHO RV MID DIMENSION: 2.2 CM

## 2024-05-17 PROCEDURE — 93306 TTE W/DOPPLER COMPLETE: CPT | Performed by: INTERNAL MEDICINE

## 2024-05-17 PROCEDURE — 93306 TTE W/DOPPLER COMPLETE: CPT

## 2024-05-28 ENCOUNTER — HOSPITAL ENCOUNTER (OUTPATIENT)
Dept: CT IMAGING | Age: 64
Discharge: HOME | End: 2024-05-28
Attending: INTERNAL MEDICINE
Payer: COMMERCIAL

## 2024-05-28 DIAGNOSIS — R59.0 LAD (LYMPHADENOPATHY), MEDIASTINAL: ICD-10-CM

## 2024-05-28 PROCEDURE — 6360000004 HC RX CONTRAST MEDICATION: Performed by: INTERNAL MEDICINE

## 2024-05-28 PROCEDURE — 2580000003 HC RX 258: Performed by: INTERNAL MEDICINE

## 2024-05-28 PROCEDURE — 71260 CT THORAX DX C+: CPT

## 2024-05-28 RX ORDER — SODIUM CHLORIDE 9 MG/ML
10 INJECTION, SOLUTION INTRAMUSCULAR; INTRAVENOUS; SUBCUTANEOUS PRN
Status: DISCONTINUED | OUTPATIENT
Start: 2024-05-28 | End: 2024-05-29 | Stop reason: HOSPADM

## 2024-05-28 RX ADMIN — IOPAMIDOL 75 ML: 755 INJECTION, SOLUTION INTRAVENOUS at 09:34

## 2024-05-28 RX ADMIN — SODIUM CHLORIDE, PRESERVATIVE FREE 10 ML: 5 INJECTION INTRAVENOUS at 09:31

## (undated) DEVICE — Device: Brand: MEDEX

## (undated) DEVICE — 3M™ AURA™ HEALTH CARE PARTICULATE RESPIRATOR AND SURGICAL MASK 1870+BULK, N95, 440 EACH/CASE.: Brand: 3M™ AURA™

## (undated) DEVICE — ET TUBE HOLDER W/BLUE STRAP

## (undated) DEVICE — BW-411B DISP COMBO CLEANING BRUSH: Brand: SINGLE USE COMBINATION CLEANING BRUSH

## (undated) DEVICE — SINGLE USE SUCTION VALVE MAJ-209: Brand: SINGLE USE SUCTION VALVE (STERILE)

## (undated) DEVICE — SYRINGE 10ML HYPO W/O NDL W/ LUER SLP TP

## (undated) DEVICE — Device: Brand: BALLOON

## (undated) DEVICE — TOWEL OR BLUEE 16X26IN ST 8 PACK ORB08 16X26ORTWL

## (undated) DEVICE — VALVE BX DISP

## (undated) DEVICE — VACLOK  NEGATIVE PRESSURE SYRINGE 6 POSITION

## (undated) DEVICE — ENDOSCOPY KIT: Brand: MEDLINE INDUSTRIES, INC.

## (undated) DEVICE — ADAPTER TBNG DIA15MM SWVL FBROPT BRONCHSCP TERM 2 AXIS PEEP

## (undated) DEVICE — TOWEL,OR,DSP,ST,BLUE,STD,6/PK,12PK/CS: Brand: MEDLINE

## (undated) DEVICE — GLOVE SURG SZ 65 THK91MIL LTX FREE SYN POLYISOPRENE

## (undated) DEVICE — BW-400B DISP SNGL-END CLEANINGBRUSH: Brand: OLYMPUS

## (undated) DEVICE — SMALL BOWL SET PLUS-LF: Brand: MEDLINE INDUSTRIES, INC.

## (undated) DEVICE — GOWN ISOLATN XL YEL M WT SIDE TIE LEV 2

## (undated) DEVICE — TUBE ET DIA9MM NSL ORAL BASIC CUF MURPHY EYE RADPQ MRK

## (undated) DEVICE — NEEDLE ASPIR 19GA HISTOLOGY FLX VIZISHOT 2